# Patient Record
Sex: FEMALE | Race: WHITE | URBAN - METROPOLITAN AREA
[De-identification: names, ages, dates, MRNs, and addresses within clinical notes are randomized per-mention and may not be internally consistent; named-entity substitution may affect disease eponyms.]

---

## 2023-10-27 ENCOUNTER — OFFICE VISIT (OUTPATIENT)
Dept: URBAN - METROPOLITAN AREA CLINIC 88 | Facility: CLINIC | Age: 43
End: 2023-10-27

## 2023-11-06 ENCOUNTER — OFFICE VISIT (OUTPATIENT)
Dept: URBAN - METROPOLITAN AREA CLINIC 88 | Facility: CLINIC | Age: 43
End: 2023-11-06
Payer: COMMERCIAL

## 2023-11-06 VITALS
BODY MASS INDEX: 33.05 KG/M2 | HEART RATE: 106 BPM | HEIGHT: 64 IN | TEMPERATURE: 97.8 F | DIASTOLIC BLOOD PRESSURE: 82 MMHG | OXYGEN SATURATION: 99 % | SYSTOLIC BLOOD PRESSURE: 131 MMHG | WEIGHT: 193.6 LBS

## 2023-11-06 DIAGNOSIS — K58.1 IRRITABLE BOWEL SYNDROME WITH CONSTIPATION: ICD-10-CM

## 2023-11-06 DIAGNOSIS — K62.5 RECTAL BLEEDING: ICD-10-CM

## 2023-11-06 DIAGNOSIS — K60.2 ANAL FISSURE: ICD-10-CM

## 2023-11-06 DIAGNOSIS — K62.89 RECTAL PAIN: ICD-10-CM

## 2023-11-06 PROBLEM — 440630006: Status: ACTIVE | Noted: 2023-11-06

## 2023-11-06 PROCEDURE — 99204 OFFICE O/P NEW MOD 45 MIN: CPT | Performed by: NURSE PRACTITIONER

## 2023-11-06 RX ORDER — LOSARTAN POTASSIUM AND HYDROCHLOROTHIAZIDE 100; 25 MG/1; MG/1
1 TABLET TABLET, FILM COATED ORAL ONCE A DAY
Status: ACTIVE | COMMUNITY

## 2023-11-06 RX ORDER — ESOMEPRAZOLE MAGNESIUM 40 MG/1
1 CAPSULE CAPSULE, DELAYED RELEASE ORAL ONCE A DAY
Status: ACTIVE | COMMUNITY

## 2023-11-06 RX ORDER — PENTOSAN POLYSULFATE SODIUM 100 MG/1
1 CAPSULE ON AN EMPTY STOMACH CAPSULE, GELATIN COATED ORAL THREE TIMES A DAY
Status: ACTIVE | COMMUNITY

## 2023-11-06 RX ORDER — LORATADINE 10 MG
1 TABLET TABLET ORAL ONCE A DAY
Status: ACTIVE | COMMUNITY

## 2023-11-06 RX ORDER — DOCUSATE SODIUM 100 MG/1
1 CAPSULE AS NEEDED CAPSULE, LIQUID FILLED ORAL ONCE A DAY
Status: ACTIVE | COMMUNITY

## 2023-11-06 NOTE — PHYSICAL EXAM RECTAL:
Chaperone assistance offered but declined by patient.  No external hemorrhoids, rectal bleeding, pain voiced at 12 o'clock  lying in left lateral position, ELSIE not fully completed,

## 2023-11-06 NOTE — HPI-TODAY'S VISIT:
Referred by Shante Wells NP for evaluation of painful rectal bleeding.  Reports a long hx of constipation.  Over the last 1-2 months, reports onset of rectal pain and bleeding.  This has been occurring persistently since then. Rectal pain is described as sharp pain that occurs with defecation.  This lasts until her next bowel movement.  Observes BRRB on tissue after wiping, in toilet and in stool.  Has tried and failed hydrocortisone suppositories and creams w/o relief.   Voices similar episode over 17 years ago.  Typically, defecation occurs once a week with stools similar to type 1 on [place] stool scale.  Lower abdominal pain, cramping and abdominal bloating occurs with constipation.  These symptoms may lessen with defecation.  Has tried and failed magnesium tablets, MiraLAX, stool softeners, Smooth Move tea and fiber diet over the years.  Denies prior colonoscopy.

## 2023-11-14 ENCOUNTER — TELEPHONE ENCOUNTER (OUTPATIENT)
Dept: URBAN - METROPOLITAN AREA CLINIC 70 | Facility: CLINIC | Age: 43
End: 2023-11-14

## 2023-11-14 RX ORDER — PLECANATIDE 3 MG/1
1 TABLET TABLET ORAL
Qty: 90 | Refills: 3 | OUTPATIENT
Start: 2023-11-16 | End: 2024-11-10

## 2023-11-22 ENCOUNTER — TELEPHONE ENCOUNTER (OUTPATIENT)
Dept: URBAN - METROPOLITAN AREA CLINIC 70 | Facility: CLINIC | Age: 43
End: 2023-11-22

## 2023-11-22 RX ORDER — PLECANATIDE 3 MG/1
1 TABLET TABLET ORAL
OUTPATIENT
Start: 2023-11-16 | End: 2024-11-10

## 2023-11-22 RX ORDER — LUBIPROSTONE 8 UG/1
1 CAPSULE WITH FOOD AND WATER CAPSULE, GELATIN COATED ORAL TWICE A DAY
Qty: 180 CAPSULE | Refills: 3 | OUTPATIENT
Start: 2023-11-22 | End: 2024-11-16

## 2023-11-29 ENCOUNTER — OFFICE VISIT (OUTPATIENT)
Dept: URBAN - METROPOLITAN AREA CLINIC 88 | Facility: CLINIC | Age: 43
End: 2023-11-29
Payer: COMMERCIAL

## 2023-11-29 ENCOUNTER — LAB OUTSIDE AN ENCOUNTER (OUTPATIENT)
Dept: URBAN - METROPOLITAN AREA CLINIC 88 | Facility: CLINIC | Age: 43
End: 2023-11-29

## 2023-11-29 VITALS
HEART RATE: 67 BPM | SYSTOLIC BLOOD PRESSURE: 116 MMHG | OXYGEN SATURATION: 98 % | WEIGHT: 200 LBS | BODY MASS INDEX: 34.15 KG/M2 | HEIGHT: 64 IN | DIASTOLIC BLOOD PRESSURE: 79 MMHG | TEMPERATURE: 97.1 F

## 2023-11-29 DIAGNOSIS — K58.1 IRRITABLE BOWEL SYNDROME WITH CONSTIPATION: ICD-10-CM

## 2023-11-29 DIAGNOSIS — K62.89 RECTAL PAIN: ICD-10-CM

## 2023-11-29 DIAGNOSIS — K62.5 RECTAL BLEEDING: ICD-10-CM

## 2023-11-29 DIAGNOSIS — K60.2 ANAL FISSURE: ICD-10-CM

## 2023-11-29 PROCEDURE — 99214 OFFICE O/P EST MOD 30 MIN: CPT | Performed by: NURSE PRACTITIONER

## 2023-11-29 RX ORDER — DOCUSATE SODIUM 100 MG/1
1 CAPSULE AS NEEDED CAPSULE, LIQUID FILLED ORAL ONCE A DAY
Status: ACTIVE | COMMUNITY

## 2023-11-29 RX ORDER — LORATADINE 10 MG
1 TABLET TABLET ORAL ONCE A DAY
Status: ACTIVE | COMMUNITY

## 2023-11-29 RX ORDER — LOSARTAN POTASSIUM AND HYDROCHLOROTHIAZIDE 100; 25 MG/1; MG/1
1 TABLET TABLET, FILM COATED ORAL ONCE A DAY
Status: ACTIVE | COMMUNITY

## 2023-11-29 RX ORDER — PLECANATIDE 3 MG/1
1 TABLET TABLET ORAL
Qty: 90 | Refills: 3 | Status: DISCONTINUED | COMMUNITY
Start: 2023-11-16 | End: 2024-11-10

## 2023-11-29 RX ORDER — LUBIPROSTONE 8 UG/1
1 CAPSULE WITH FOOD AND WATER CAPSULE, GELATIN COATED ORAL TWICE A DAY
Status: ACTIVE | COMMUNITY

## 2023-11-29 RX ORDER — LUBIPROSTONE 8 UG/1
1 CAPSULE WITH FOOD AND WATER CAPSULE, GELATIN COATED ORAL TWICE A DAY
OUTPATIENT
Start: 2023-11-22

## 2023-11-29 RX ORDER — ESOMEPRAZOLE MAGNESIUM 40 MG/1
1 CAPSULE CAPSULE, DELAYED RELEASE ORAL ONCE A DAY
Status: ACTIVE | COMMUNITY

## 2023-11-29 RX ORDER — PENTOSAN POLYSULFATE SODIUM 100 MG/1
1 CAPSULE ON AN EMPTY STOMACH CAPSULE, GELATIN COATED ORAL THREE TIMES A DAY
Status: ACTIVE | COMMUNITY

## 2023-11-29 NOTE — HPI-OTHER HISTORIES
---------------------------------------------------------------- Last office note 11/06/2023: Referred by Shante Wells NP for evaluation of painful rectal bleeding. Reports a long hx of constipation. Over the last 1-2 months, reports onset of rectal pain and bleeding. This has been occurring persistently since then. Rectal pain is described as sharp pain that occurs with defecation. This lasts until her next bowel movement. Observes BRRB on tissue after wiping, in toilet and in stool. Has tried and failed hydrocortisone suppositories and creams w/o relief. Voices similar episode over 17 years ago. Typically, defecation occurs once a week with stools similar to type 1 on [place] stool scale. Lower abdominal pain, cramping and abdominal bloating occurs with constipation. These symptoms may lessen with defecation. Has tried and failed magnesium tablets, MiraLAX, stool softeners, Smooth Move tea and fiber diet over the years. Denies prior colonoscopy.

## 2023-11-29 NOTE — HPI-TODAY'S VISIT:
Patient presents today for follow up.  Continues to voice rectal pain on daily basis despite use of NTG/Lidocaine compound cream.  Takes naproxen as needed for pain.  Rectal bleeding has resolved but rectal pain persists.   Provided samples of Trulance at LOV and felt this lead to a complete sense of evacuation of stool.  Due to insurance coverage, patient switched to lubiprostone.  Feels this medication is not as effective despite daily use.  Defecation occurs once every 2 days.  Fails to experience a complete sense of evacuation.  Stool consistency has improved since starting lubiprostone.    Denies prior colonoscopy.

## 2023-12-06 ENCOUNTER — CLAIMS CREATED FROM THE CLAIM WINDOW (OUTPATIENT)
Dept: URBAN - METROPOLITAN AREA SURGERY CENTER 24 | Facility: SURGERY CENTER | Age: 43
End: 2023-12-06
Payer: COMMERCIAL

## 2023-12-06 DIAGNOSIS — K64.8 OTHER HEMORRHOIDS: ICD-10-CM

## 2023-12-06 DIAGNOSIS — K63.5 BENIGN COLON POLYP: ICD-10-CM

## 2023-12-06 DIAGNOSIS — K62.5 ANAL BLEEDING: ICD-10-CM

## 2023-12-06 DIAGNOSIS — K62.89 ACUTE PROCTITIS: ICD-10-CM

## 2023-12-06 DIAGNOSIS — K62.5 RECTAL BLEEDING: ICD-10-CM

## 2023-12-06 PROCEDURE — 00811 ANES LWR INTST NDSC NOS: CPT | Performed by: NURSE ANESTHETIST, CERTIFIED REGISTERED

## 2023-12-06 PROCEDURE — 45380 COLONOSCOPY AND BIOPSY: CPT | Performed by: INTERNAL MEDICINE

## 2023-12-06 PROCEDURE — G8907 PT DOC NO EVENTS ON DISCHARG: HCPCS | Performed by: INTERNAL MEDICINE

## 2023-12-29 ENCOUNTER — TELEPHONE ENCOUNTER (OUTPATIENT)
Dept: URBAN - METROPOLITAN AREA CLINIC 88 | Facility: CLINIC | Age: 43
End: 2023-12-29

## 2023-12-29 ENCOUNTER — LAB OUTSIDE AN ENCOUNTER (OUTPATIENT)
Dept: URBAN - METROPOLITAN AREA CLINIC 88 | Facility: CLINIC | Age: 43
End: 2023-12-29

## 2023-12-29 RX ORDER — PLECANATIDE 3 MG/1
1 TABLET TABLET ORAL
Qty: 90 | Refills: 3 | OUTPATIENT
Start: 2023-12-29 | End: 2024-12-23

## 2024-01-03 ENCOUNTER — LAB OUTSIDE AN ENCOUNTER (OUTPATIENT)
Dept: URBAN - METROPOLITAN AREA CLINIC 88 | Facility: CLINIC | Age: 44
End: 2024-01-03

## 2024-01-03 ENCOUNTER — TELEPHONE ENCOUNTER (OUTPATIENT)
Dept: URBAN - METROPOLITAN AREA CLINIC 88 | Facility: CLINIC | Age: 44
End: 2024-01-03

## 2024-01-10 ENCOUNTER — LAB OUTSIDE AN ENCOUNTER (OUTPATIENT)
Dept: URBAN - METROPOLITAN AREA CLINIC 70 | Facility: CLINIC | Age: 44
End: 2024-01-10

## 2024-01-12 ENCOUNTER — TELEPHONE ENCOUNTER (OUTPATIENT)
Dept: URBAN - METROPOLITAN AREA CLINIC 70 | Facility: CLINIC | Age: 44
End: 2024-01-12

## 2024-01-17 ENCOUNTER — DASHBOARD ENCOUNTERS (OUTPATIENT)
Age: 44
End: 2024-01-17

## 2024-01-17 ENCOUNTER — OFFICE VISIT (OUTPATIENT)
Dept: URBAN - METROPOLITAN AREA CLINIC 88 | Facility: CLINIC | Age: 44
End: 2024-01-17

## 2024-01-17 RX ORDER — LUBIPROSTONE 8 UG/1
1 CAPSULE WITH FOOD AND WATER CAPSULE, GELATIN COATED ORAL TWICE A DAY
Status: ACTIVE | COMMUNITY
Start: 2023-11-22

## 2024-01-17 RX ORDER — DOCUSATE SODIUM 100 MG/1
1 CAPSULE AS NEEDED CAPSULE, LIQUID FILLED ORAL ONCE A DAY
Status: ACTIVE | COMMUNITY

## 2024-01-17 RX ORDER — ESOMEPRAZOLE MAGNESIUM 40 MG/1
1 CAPSULE CAPSULE, DELAYED RELEASE ORAL ONCE A DAY
Status: ACTIVE | COMMUNITY

## 2024-01-17 RX ORDER — PENTOSAN POLYSULFATE SODIUM 100 MG/1
1 CAPSULE ON AN EMPTY STOMACH CAPSULE, GELATIN COATED ORAL THREE TIMES A DAY
Status: ACTIVE | COMMUNITY

## 2024-01-17 RX ORDER — LORATADINE 10 MG
1 TABLET TABLET ORAL ONCE A DAY
Status: ACTIVE | COMMUNITY

## 2024-01-17 RX ORDER — LOSARTAN POTASSIUM AND HYDROCHLOROTHIAZIDE 100; 25 MG/1; MG/1
1 TABLET TABLET, FILM COATED ORAL ONCE A DAY
Status: ACTIVE | COMMUNITY

## 2024-01-17 RX ORDER — PLECANATIDE 3 MG/1
1 TABLET TABLET ORAL
Qty: 90 | Refills: 3 | Status: ACTIVE | COMMUNITY
Start: 2023-12-29 | End: 2024-12-23

## 2024-01-17 NOTE — HPI-TODAY'S VISIT:
Presents today for follow up after undergoing colonoscopy procedure by Dr. Merrill on 12/06/2023 due to c/o rectal pain and bleeding.  Findings:  4 mm hyperplastic polyp and internal hemorrhoids.  Due to continued c/o pain, MRI pelvis ordered.   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  Referred to colorectal specialist for additional management and care.

## 2024-01-17 NOTE — HPI-OTHER HISTORIES
--------------------------------------------------------------- Last office note 11/29/2023: Patient presents today for follow up. Continues to voice rectal pain on daily basis despite use of NTG/Lidocaine compound cream. Takes naproxen as needed for pain. Rectal bleeding has resolved but rectal pain persists.   Provided samples of Trulance at LOV and felt this lead to a complete sense of evacuation of stool. Due to insurance coverage, patient switched to lubiprostone. Feels this medication is not as effective despite daily use. Defecation occurs once every 2 days. Fails to experience a complete sense of evacuation. Stool consistency has improved since starting lubiprostone.  Denies prior colonoscopy.